# Patient Record
Sex: FEMALE | Race: WHITE | Employment: FULL TIME | URBAN - METROPOLITAN AREA
[De-identification: names, ages, dates, MRNs, and addresses within clinical notes are randomized per-mention and may not be internally consistent; named-entity substitution may affect disease eponyms.]

---

## 2018-08-06 ENCOUNTER — EMPLOYEE HEALTH (OUTPATIENT)
Dept: OTHER | Facility: HOSPITAL | Age: 53
End: 2018-08-06
Attending: PREVENTIVE MEDICINE

## 2018-08-06 DIAGNOSIS — Z01.84 IMMUNITY STATUS TESTING: Primary | ICD-10-CM

## 2018-08-06 LAB
RUBV IGG SER QL: POSITIVE
RUBV IGG SER-ACNC: 164.2 IU/ML (ref 10–?)

## 2018-08-06 PROCEDURE — 86787 VARICELLA-ZOSTER ANTIBODY: CPT

## 2018-08-06 PROCEDURE — 86765 RUBEOLA ANTIBODY: CPT

## 2018-08-06 PROCEDURE — 86762 RUBELLA ANTIBODY: CPT

## 2018-08-06 PROCEDURE — 86735 MUMPS ANTIBODY: CPT

## 2018-08-07 LAB
MEV IGG SER IA-ACNC: POSITIVE
MUV IGG SER IA-ACNC: POSITIVE
VZV IGG SER IA-ACNC: POSITIVE

## 2020-01-10 PROBLEM — R14.1 GAS PAIN: Status: ACTIVE | Noted: 2020-01-10

## 2020-01-10 PROBLEM — Z12.11 SPECIAL SCREENING FOR MALIGNANT NEOPLASM OF COLON: Status: ACTIVE | Noted: 2020-01-10

## 2020-01-14 ENCOUNTER — APPOINTMENT (OUTPATIENT)
Dept: LAB | Age: 55
End: 2020-01-14
Attending: INTERNAL MEDICINE
Payer: COMMERCIAL

## 2020-01-14 DIAGNOSIS — R17 ELEVATED BILIRUBIN: ICD-10-CM

## 2020-01-14 LAB
ALBUMIN SERPL-MCNC: 4.3 G/DL (ref 3.4–5)
ALBUMIN/GLOB SERPL: 1.2 {RATIO} (ref 1–2)
ALP LIVER SERPL-CCNC: 80 U/L (ref 41–108)
ALT SERPL-CCNC: 30 U/L (ref 13–56)
ANION GAP SERPL CALC-SCNC: 3 MMOL/L (ref 0–18)
AST SERPL-CCNC: 21 U/L (ref 15–37)
BILIRUB DIRECT SERPL-MCNC: 0.3 MG/DL (ref 0–0.2)
BILIRUB SERPL-MCNC: 1.7 MG/DL (ref 0.1–2)
BUN BLD-MCNC: 19 MG/DL (ref 7–18)
BUN/CREAT SERPL: 22.4 (ref 10–20)
CALCIUM BLD-MCNC: 9.8 MG/DL (ref 8.5–10.1)
CHLORIDE SERPL-SCNC: 109 MMOL/L (ref 98–112)
CO2 SERPL-SCNC: 29 MMOL/L (ref 21–32)
CREAT BLD-MCNC: 0.85 MG/DL (ref 0.55–1.02)
GLOBULIN PLAS-MCNC: 3.5 G/DL (ref 2.8–4.4)
GLUCOSE BLD-MCNC: 86 MG/DL (ref 70–99)
M PROTEIN MFR SERPL ELPH: 7.8 G/DL (ref 6.4–8.2)
OSMOLALITY SERPL CALC.SUM OF ELEC: 294 MOSM/KG (ref 275–295)
PATIENT FASTING Y/N/NP: NO
POTASSIUM SERPL-SCNC: 3.8 MMOL/L (ref 3.5–5.1)
SODIUM SERPL-SCNC: 141 MMOL/L (ref 136–145)

## 2020-01-14 PROCEDURE — 80053 COMPREHEN METABOLIC PANEL: CPT

## 2020-01-14 PROCEDURE — 82248 BILIRUBIN DIRECT: CPT

## 2020-01-14 PROCEDURE — 36415 COLL VENOUS BLD VENIPUNCTURE: CPT

## 2020-02-11 ENCOUNTER — HOSPITAL ENCOUNTER (OUTPATIENT)
Dept: ULTRASOUND IMAGING | Age: 55
Discharge: HOME | End: 2020-02-11
Attending: INTERNAL MEDICINE
Payer: COMMERCIAL

## 2020-02-11 DIAGNOSIS — R74.01 ELEVATED ALT MEASUREMENT: ICD-10-CM

## 2020-02-11 PROCEDURE — 76700 US EXAM ABDOM COMPLETE: CPT | Performed by: INTERNAL MEDICINE

## 2020-06-25 DIAGNOSIS — Z78.9 PARTICIPANT IN HEALTH AND WELLNESS PLAN: Primary | ICD-10-CM

## 2020-07-08 ENCOUNTER — NURSE ONLY (OUTPATIENT)
Dept: LAB | Age: 55
End: 2020-07-08
Attending: PREVENTIVE MEDICINE

## 2020-07-08 DIAGNOSIS — Z78.9 PARTICIPANT IN HEALTH AND WELLNESS PLAN: ICD-10-CM

## 2020-07-08 LAB — SARS-COV-2 IGG SERPLBLD QL IA.RAPID: NEGATIVE

## 2020-07-08 PROCEDURE — 86769 SARS-COV-2 COVID-19 ANTIBODY: CPT

## 2020-07-22 ENCOUNTER — TELEPHONE (OUTPATIENT)
Dept: INTERNAL MEDICINE CLINIC | Facility: HOSPITAL | Age: 55
End: 2020-07-22

## 2020-07-22 ENCOUNTER — LAB ENCOUNTER (OUTPATIENT)
Dept: LAB | Facility: HOSPITAL | Age: 55
End: 2020-07-22
Attending: PREVENTIVE MEDICINE
Payer: COMMERCIAL

## 2020-07-22 DIAGNOSIS — Z20.822 SUSPECTED COVID-19 VIRUS INFECTION: ICD-10-CM

## 2020-07-22 DIAGNOSIS — Z20.822 SUSPECTED COVID-19 VIRUS INFECTION: Primary | ICD-10-CM

## 2020-07-22 LAB — SARS-COV-2 RNA RESP QL NAA+PROBE: NOT DETECTED

## 2020-08-11 ENCOUNTER — LAB REQUISITION (OUTPATIENT)
Dept: LAB | Facility: HOSPITAL | Age: 55
End: 2020-08-11
Payer: COMMERCIAL

## 2020-08-11 DIAGNOSIS — D49.2 NEOPLASM OF UNSPECIFIED BEHAVIOR OF BONE, SOFT TISSUE, AND SKIN: ICD-10-CM

## 2020-08-11 PROCEDURE — 88305 TISSUE EXAM BY PATHOLOGIST: CPT | Performed by: PLASTIC SURGERY

## 2020-11-24 ENCOUNTER — LAB ENCOUNTER (OUTPATIENT)
Dept: LAB | Age: 55
End: 2020-11-24
Attending: EMERGENCY MEDICINE
Payer: COMMERCIAL

## 2020-11-24 ENCOUNTER — TELEPHONE (OUTPATIENT)
Dept: INTERNAL MEDICINE CLINIC | Facility: HOSPITAL | Age: 55
End: 2020-11-24

## 2020-11-24 DIAGNOSIS — Z20.822 EXPOSURE TO COVID-19 VIRUS: Primary | ICD-10-CM

## 2020-11-24 NOTE — TELEPHONE ENCOUNTER
Results and RTW guidelines:    COVID RESULT GIVEN:    []POSITIVE     [x] NEGATIVE       Ordered  retest?  []Yes   [x] No (skip to RTW)       Date ordered:     11/24/20        Dated to be taken:  11/24/20    If Yes, PLACE ORDER NOW and instruct the

## 2020-12-19 ENCOUNTER — IMMUNIZATION (OUTPATIENT)
Dept: LAB | Facility: HOSPITAL | Age: 55
End: 2020-12-19
Attending: PREVENTIVE MEDICINE
Payer: COMMERCIAL

## 2020-12-19 DIAGNOSIS — Z23 NEED FOR VACCINATION: ICD-10-CM

## 2020-12-19 PROCEDURE — 0001A PFIZER-BIONTECH COVID-19 VACCINE: CPT

## 2021-01-09 ENCOUNTER — IMMUNIZATION (OUTPATIENT)
Dept: LAB | Facility: HOSPITAL | Age: 56
End: 2021-01-09
Attending: PREVENTIVE MEDICINE

## 2021-01-09 DIAGNOSIS — Z23 NEED FOR VACCINATION: ICD-10-CM

## 2021-01-09 PROCEDURE — 0002A SARSCOV2 VAC 30MCG/0.3ML IM: CPT

## 2021-07-28 ENCOUNTER — OFFICE VISIT (OUTPATIENT)
Dept: FAMILY MEDICINE CLINIC | Facility: CLINIC | Age: 56
End: 2021-07-28
Payer: COMMERCIAL

## 2021-07-28 VITALS
DIASTOLIC BLOOD PRESSURE: 64 MMHG | TEMPERATURE: 98 F | OXYGEN SATURATION: 98 % | WEIGHT: 150 LBS | RESPIRATION RATE: 16 BRPM | HEART RATE: 86 BPM | SYSTOLIC BLOOD PRESSURE: 104 MMHG | HEIGHT: 69 IN | BODY MASS INDEX: 22.22 KG/M2

## 2021-07-28 DIAGNOSIS — N30.01 ACUTE CYSTITIS WITH HEMATURIA: Primary | ICD-10-CM

## 2021-07-28 DIAGNOSIS — R39.9 UTI SYMPTOMS: ICD-10-CM

## 2021-07-28 LAB
BILIRUBIN: NEGATIVE
GLUCOSE (URINE DIPSTICK): NEGATIVE MG/DL
KETONES (URINE DIPSTICK): NEGATIVE MG/DL
MULTISTIX LOT#: 4011 NUMERIC
NITRITE, URINE: NEGATIVE
PH, URINE: 5.5 (ref 4.5–8)
PROTEIN (URINE DIPSTICK): NEGATIVE MG/DL
SPECIFIC GRAVITY: 1.02 (ref 1–1.03)
URINE-COLOR: YELLOW
UROBILINOGEN,SEMI-QN: 0.2 MG/DL (ref 0–1.9)

## 2021-07-28 PROCEDURE — 99202 OFFICE O/P NEW SF 15 MIN: CPT | Performed by: NURSE PRACTITIONER

## 2021-07-28 PROCEDURE — 3078F DIAST BP <80 MM HG: CPT | Performed by: NURSE PRACTITIONER

## 2021-07-28 PROCEDURE — 3074F SYST BP LT 130 MM HG: CPT | Performed by: NURSE PRACTITIONER

## 2021-07-28 PROCEDURE — 81003 URINALYSIS AUTO W/O SCOPE: CPT | Performed by: NURSE PRACTITIONER

## 2021-07-28 PROCEDURE — 87086 URINE CULTURE/COLONY COUNT: CPT | Performed by: NURSE PRACTITIONER

## 2021-07-28 PROCEDURE — 3008F BODY MASS INDEX DOCD: CPT | Performed by: NURSE PRACTITIONER

## 2021-07-28 RX ORDER — NITROFURANTOIN 25; 75 MG/1; MG/1
100 CAPSULE ORAL 2 TIMES DAILY
Qty: 10 CAPSULE | Refills: 0 | Status: SHIPPED | OUTPATIENT
Start: 2021-07-28 | End: 2021-08-02

## 2021-07-28 NOTE — PROGRESS NOTES
Migdalia Wang is a 49year old female. HPI:   Patient presents with symptoms of UTI for 2 days. Complaining of urinary frequency, urgency, dysuria, pos suprapubic presure. Denies back pain, fever, hematuria.   Pt has history of UTI in past.    PE Negative    Ketones, UA Negative Negative - Trace mg/dL    Spec Gravity 1.025 1.005 - 1.030    Blood Urine Large (A) Negative    PH Urine 5.5 5.0 - 8.0    Protein Urine Negative Negative - Trace mg/dL    Urobilinogen Urine 0.2 0.2 - 1.0 mg/dL    Nitrite Ur stay in the hospital. You may have a fever and lower back pain. Medicines to treat a UTI  Most UTIs are treated with antibiotics. These kill the bacteria. The length of time you need to take them depends on the type of infection.  It may be as short as 3 for professional medical care. Always follow your healthcare professional's instructions. The patient indicates understanding of these issues and agrees to the plan.

## 2021-07-28 NOTE — PATIENT INSTRUCTIONS
Urinary Tract Infections in Women  Urinary tract infections (UTIs) are most often caused by bacteria. These bacteria enter the urinary tract. The bacteria may come from inside the body.  Or they may travel from the skin outside the rectum or vagina into t They may also help prevent future UTIs. · Drink plenty of fluids. This includes water, juice, or other caffeine-free drinks. Fluids help flush bacteria out of your body. · Empty your bladder. Always empty your bladder when you feel the urge to pee.  And

## 2021-08-21 ENCOUNTER — OFFICE VISIT (OUTPATIENT)
Dept: FAMILY MEDICINE CLINIC | Facility: CLINIC | Age: 56
End: 2021-08-21
Payer: COMMERCIAL

## 2021-08-21 VITALS
BODY MASS INDEX: 22.22 KG/M2 | DIASTOLIC BLOOD PRESSURE: 72 MMHG | OXYGEN SATURATION: 99 % | HEIGHT: 69 IN | HEART RATE: 59 BPM | TEMPERATURE: 98 F | SYSTOLIC BLOOD PRESSURE: 122 MMHG | RESPIRATION RATE: 12 BRPM | WEIGHT: 150 LBS

## 2021-08-21 DIAGNOSIS — N30.01 ACUTE CYSTITIS WITH HEMATURIA: Primary | ICD-10-CM

## 2021-08-21 LAB
BILIRUBIN: NEGATIVE
GLUCOSE (URINE DIPSTICK): NEGATIVE MG/DL
KETONES (URINE DIPSTICK): NEGATIVE MG/DL
MULTISTIX LOT#: ABNORMAL NUMERIC
NITRITE, URINE: NEGATIVE
PH, URINE: 5.5 (ref 4.5–8)
PROTEIN (URINE DIPSTICK): NEGATIVE MG/DL
SPECIFIC GRAVITY: 1.02 (ref 1–1.03)
URINE-COLOR: YELLOW
UROBILINOGEN,SEMI-QN: 0.2 MG/DL (ref 0–1.9)

## 2021-08-21 PROCEDURE — 81003 URINALYSIS AUTO W/O SCOPE: CPT | Performed by: FAMILY MEDICINE

## 2021-08-21 PROCEDURE — 87086 URINE CULTURE/COLONY COUNT: CPT | Performed by: FAMILY MEDICINE

## 2021-08-21 PROCEDURE — 99213 OFFICE O/P EST LOW 20 MIN: CPT | Performed by: FAMILY MEDICINE

## 2021-08-21 PROCEDURE — 3008F BODY MASS INDEX DOCD: CPT | Performed by: FAMILY MEDICINE

## 2021-08-21 PROCEDURE — 3074F SYST BP LT 130 MM HG: CPT | Performed by: FAMILY MEDICINE

## 2021-08-21 PROCEDURE — 3078F DIAST BP <80 MM HG: CPT | Performed by: FAMILY MEDICINE

## 2021-08-21 RX ORDER — NITROFURANTOIN 25; 75 MG/1; MG/1
100 CAPSULE ORAL 2 TIMES DAILY
Qty: 10 CAPSULE | Refills: 0 | Status: SHIPPED | OUTPATIENT
Start: 2021-08-21 | End: 2021-09-04

## 2021-08-21 NOTE — PATIENT INSTRUCTIONS
Sangeeta Rinaldi,  If your urine culture comes back with no growth of bacteria, please follow up with your primary care provider regarding these symptoms. Bladder Infection, Female (Adult)     Urine normally doesn't have any germs (bacteria) in it.  But bacteria they travel up to the bladder, causing inflammation and infection. This often happens because of:  · Wiping incorrectly after urinating. Always wipe from front to back.   · Bowel incontinence  · Pregnancy  · Procedures such as having a catheter put in  · Ol wear tight-fitting pants. · Improve your diet and prevent constipation. Eat more fresh fruits and vegetables, and fiber. Eat less junk foods and fatty foods. · Don't have sex until your symptoms are gone. · Don't have caffeine, alcohol, and spicy foods.

## 2021-08-21 NOTE — PROGRESS NOTES
Eulalia Preciado is a 49year old female. S:  Patient presents today with the following concerns:  · Dysuria since this morning. Has not noticed blood in urine. Pressure in low abdomen. No back pain. No fevers/chills. · No vaginal symptoms. Placed This Encounter      Urine Dip, auto without Micro      Urine Culture, Routine    Meds & Refills for this Visit:  Requested Prescriptions     Signed Prescriptions Disp Refills   • nitrofurantoin monohydrate macro 100 MG Oral Cap 10 capsule 0     Sig:

## 2021-09-04 ENCOUNTER — OFFICE VISIT (OUTPATIENT)
Dept: FAMILY MEDICINE CLINIC | Facility: CLINIC | Age: 56
End: 2021-09-04
Payer: COMMERCIAL

## 2021-09-04 VITALS
WEIGHT: 150 LBS | HEART RATE: 70 BPM | OXYGEN SATURATION: 98 % | SYSTOLIC BLOOD PRESSURE: 122 MMHG | HEIGHT: 69 IN | BODY MASS INDEX: 22.22 KG/M2 | TEMPERATURE: 98 F | RESPIRATION RATE: 16 BRPM | DIASTOLIC BLOOD PRESSURE: 76 MMHG

## 2021-09-04 DIAGNOSIS — J02.9 SORE THROAT: ICD-10-CM

## 2021-09-04 DIAGNOSIS — Z20.822 ENCOUNTER FOR LABORATORY TESTING FOR COVID-19 VIRUS: Primary | ICD-10-CM

## 2021-09-04 LAB
CONTROL LINE PRESENT WITH A CLEAR BACKGROUND (YES/NO): YES YES/NO
KIT EXPIRATION DATE: NORMAL DATE
KIT LOT #: NORMAL NUMERIC
STREP GRP A CUL-SCR: NEGATIVE

## 2021-09-04 PROCEDURE — 3074F SYST BP LT 130 MM HG: CPT | Performed by: NURSE PRACTITIONER

## 2021-09-04 PROCEDURE — 87081 CULTURE SCREEN ONLY: CPT | Performed by: NURSE PRACTITIONER

## 2021-09-04 PROCEDURE — 99213 OFFICE O/P EST LOW 20 MIN: CPT | Performed by: NURSE PRACTITIONER

## 2021-09-04 PROCEDURE — 87880 STREP A ASSAY W/OPTIC: CPT | Performed by: NURSE PRACTITIONER

## 2021-09-04 PROCEDURE — 3078F DIAST BP <80 MM HG: CPT | Performed by: NURSE PRACTITIONER

## 2021-09-04 PROCEDURE — 3008F BODY MASS INDEX DOCD: CPT | Performed by: NURSE PRACTITIONER

## 2021-09-04 NOTE — PROGRESS NOTES
HPI:   Calla Lesch is a 49year old female who presents with ill symptoms for  2  days. Patient reports sore throat, congestion, OTC cold meds have not been helping, denies fever, denies cough.  Has tried Dayquil and Nyquil and Advil with not much r auscultation  CARDIO: RRR without murmur  Results for orders placed or performed in visit on 09/04/21   STREP A ASSAY W/OPTIC    Collection Time: 09/04/21 10:25 AM   Result Value Ref Range    Strep Grp A Screen negative Negative    Control Line Present wit

## 2021-09-04 NOTE — PATIENT INSTRUCTIONS
· Please start Claritin or Allegra daily for congestion/post nasal drip. Stop Nyquil products as this will worsen dryness. · Use salt water gargles and lozenges as needed for comfort. · Follow up with Dr. José Miguel Rm if symptoms persist longer than 2 weeks. directed by the healthcare provider. After this time, you or your child will not be contagious.  You or your child can then return to work or school when feeling better or as directed by this facility or your healthcare provider.   · Use the antibiotic medi or fever, unless another medicine was prescribed for this. If you have chronic liver or kidney disease or ever had a stomach ulcer or gastrointestinal bleeding, talk with your healthcare provider before using these medicines.    Follow-up care  Follow up wi first pass. The provider may want to confirm with a rectal temperature. · Ear (tympanic). Ear temperatures are accurate after 10months of age, but not before. · Armpit (axillary).  This is the least reliable but may be used for a first pass to check a chi follow your healthcare professional's instructions.

## 2021-09-05 LAB — SARS-COV-2 RNA RESP QL NAA+PROBE: DETECTED

## 2021-09-07 ENCOUNTER — TELEPHONE (OUTPATIENT)
Dept: INTERNAL MEDICINE CLINIC | Facility: HOSPITAL | Age: 56
End: 2021-09-07

## 2021-09-07 NOTE — TELEPHONE ENCOUNTER
Outside Covid Testing done    Results and RTW guidelines:    COVID RESULT reported:      Test type:    [] Rapid outside         [x] PCR outside    Date of test: 9/4/21    Test location: [facility]         [x] Result viewed in Epic with verbal consent r instructions  [x]  S/S of worsening infection/condition and importance of prompt medical re-evaluation including when to seek emergency care  [] If symptoms develop, stay home and call hotline for rapid test order    Estimated RTW clearance date:  9/13/21 when did they test positive?)      Previous history of covid: Yes []     No [x]   Any recent travel: Yes [x]     No []    If yes, location: 9/2/21 [place]    What shift do you work? days  When was the last shift you worked?  9/1/21  When are you next scheduled t

## 2021-09-29 ENCOUNTER — HOSPITAL ENCOUNTER (OUTPATIENT)
Age: 56
Discharge: HOME | End: 2021-09-29
Payer: COMMERCIAL

## 2021-09-29 VITALS
HEART RATE: 69 BPM | HEIGHT: 69 IN | BODY MASS INDEX: 22.22 KG/M2 | SYSTOLIC BLOOD PRESSURE: 134 MMHG | TEMPERATURE: 98 F | DIASTOLIC BLOOD PRESSURE: 57 MMHG | RESPIRATION RATE: 19 BRPM | OXYGEN SATURATION: 99 % | WEIGHT: 150 LBS

## 2021-09-29 DIAGNOSIS — K13.79 UVULAR EDEMA: Primary | ICD-10-CM

## 2021-09-29 PROCEDURE — 99213 OFFICE O/P EST LOW 20 MIN: CPT | Performed by: NURSE PRACTITIONER

## 2021-09-29 PROCEDURE — 87880 STREP A ASSAY W/OPTIC: CPT | Performed by: NURSE PRACTITIONER

## 2021-09-29 RX ORDER — PREDNISONE 20 MG/1
40 TABLET ORAL DAILY
Qty: 4 TABLET | Refills: 0 | Status: SHIPPED | OUTPATIENT
Start: 2021-09-29 | End: 2021-10-01

## 2021-09-29 NOTE — ED PROVIDER NOTES
Patient Seen in: [facility]mediate [address] [place] [address]      History   Patient presents with:  Sore Throat    Stated Complaint: Sore Throat;  Tested Positive on 9/4/21    Subjective:   HPI      CHIEF COMPLAINT:   Patient presents with:  Sore Throat      HPI:   E Medical History:   Diagnosis Date   • Allergic rhinitis    • Bloating    • Constipation    • Diarrhea, unspecified    • Frequent use of laxatives    • Hemorrhoids    • History of eating disorder Began in college into my 20s   • Irregular bowel habits    • lymphadenopathy. ED Course     Labs Reviewed   POCT RAPID STREP - Normal   GRP A STREP CULT, THROAT         ASSESSMENT AND PLAN:   Assessment: 1.  Pharyngitis: Rapid strep screen is negative     Plan: Discussed that due to symptoms and negative rapid str Physiological [facility]

## 2022-03-14 ENCOUNTER — TELEPHONE (OUTPATIENT)
Dept: INTERNAL MEDICINE CLINIC | Facility: HOSPITAL | Age: 57
End: 2022-03-14

## 2022-03-14 ENCOUNTER — NURSE ONLY (OUTPATIENT)
Dept: LAB | Facility: HOSPITAL | Age: 57
End: 2022-03-14
Attending: PREVENTIVE MEDICINE

## 2022-03-14 DIAGNOSIS — Z20.822 SUSPECTED 2019 NOVEL CORONAVIRUS INFECTION: ICD-10-CM

## 2022-03-14 LAB — SARS-COV-2 RNA RESP QL NAA+PROBE: NOT DETECTED

## 2022-03-14 NOTE — TELEPHONE ENCOUNTER
[x] [address] [facility]  []TORI   [] [address] [facility]  Manager : Chelsey montano    HAVE YOU RECEIVED THE COVID-19 Vaccine? Yes [x]    No []          If yes, date(s) received:            Pfizer 3-dose series    Dose 1  12/19/2020 (Covid-19 Vaccine Pfizer 30 mcg/0.3 ml)      Dose 2  01/09/2021 (Covid-19 Vaccine Pfizer 30 mcg/0.3 ml)       Which vaccine:  Pfizer [x]     Leila Ovalle []    J&J []      SYMPTOMS (reported via dashboard):  [] asymptomatic  [x] symptomatic cough, congestion, sore throat, headache  [] GI symptoms only    Symptom onset date:03/13/22  Any fever, vomiting or diarrhea?:Yes []     No []    If yes describe:    Employee has positive COVID Exposure? Yes []     No [x]    Date of exposure:     []  Coworker                       [] patient                        [] Family/friend    When was the last shift you worked?: [id number] on 03/10/22    PLAN:     COVID-19 testing ordered: [x] Rapid    [] [facility]              Date test is to be taken:   03/14/22    []  Outside testing                           Notes: son sick at home tested negative.     INSTRUCTIONS PROVIDED:    [x]  Employee was instructed to call Central scheduling at [phone] or use [employer] to make an appointment for their testing and once at the site remain in their vehicle and call [phone] to register for the appointment  []  May return to work if employee views negative result in [place] and remains fever, vomiting, and diarrhea free  [x]  May continue to work if remains asymptomatic and views negative result in [place]  []  Follow up for condition update when resulting  []  If symptoms develop, stay home and call hotline for rapid test order  [x]  If COVID positive results, off work minimum of 5 days from positive test or onset of symptoms (day 0)    []      On day 5, if asymptomatic or mildly symptomatic (with improving symptoms) may return to work day 6  []      On day 5, if symptomatic, call Employee Health for RTW screening        [x]  Plan noted above  []  Length of time to obtain results  []  Quarantine instructions  []  S/S of worsening infection/condition and importance of prompt medical re-evaluation including when to seek emergency care.    [x] The employee voiced understanding

## 2022-05-24 ENCOUNTER — HOSPITAL ENCOUNTER (OUTPATIENT)
Age: 57
Discharge: HOME | End: 2022-05-24
Payer: COMMERCIAL

## 2022-05-24 VITALS
SYSTOLIC BLOOD PRESSURE: 126 MMHG | TEMPERATURE: 98 F | HEIGHT: 69 IN | DIASTOLIC BLOOD PRESSURE: 63 MMHG | HEART RATE: 63 BPM | WEIGHT: 148 LBS | RESPIRATION RATE: 16 BRPM | OXYGEN SATURATION: 99 % | BODY MASS INDEX: 21.92 KG/M2

## 2022-05-24 DIAGNOSIS — J01.00 ACUTE MAXILLARY SINUSITIS, RECURRENCE NOT SPECIFIED: Primary | ICD-10-CM

## 2022-05-24 LAB — SARS-COV-2 RNA RESP QL NAA+PROBE: NOT DETECTED

## 2022-05-24 PROCEDURE — 99213 OFFICE O/P EST LOW 20 MIN: CPT | Performed by: NURSE PRACTITIONER

## 2022-05-24 PROCEDURE — U0002 COVID-19 LAB TEST NON-CDC: HCPCS | Performed by: NURSE PRACTITIONER

## 2022-05-24 RX ORDER — AMOXICILLIN AND CLAVULANATE POTASSIUM 875; 125 MG/1; MG/1
1 TABLET, FILM COATED ORAL 2 TIMES DAILY
Qty: 20 TABLET | Refills: 0 | Status: SHIPPED | OUTPATIENT
Start: 2022-05-24 | End: 2022-06-03

## 2022-05-24 RX ORDER — NEOMYCIN SULFATE, POLYMYXIN B SULFATE AND DEXAMETHASONE 3.5; 10000; 1 MG/ML; [USP'U]/ML; MG/ML
SUSPENSION/ DROPS OPHTHALMIC
COMMUNITY
Start: 2022-05-23

## 2022-05-24 NOTE — ED INITIAL ASSESSMENT (HPI)
Pt began 2 days ago with a red and swollen left eye.   Saw the eye doctor, and no abrasion, and given drops, and then began with sinus congestion dripped into her throat and facial pain no gum bleeding/no nose bleeding

## 2022-10-10 ENCOUNTER — IMMUNIZATION (OUTPATIENT)
Dept: LAB | Facility: HOSPITAL | Age: 57
End: 2022-10-10
Attending: PREVENTIVE MEDICINE
Payer: COMMERCIAL

## 2022-10-10 DIAGNOSIS — Z23 NEED FOR VACCINATION: Primary | ICD-10-CM

## 2022-10-10 PROCEDURE — 90471 IMMUNIZATION ADMIN: CPT

## 2023-01-04 ENCOUNTER — NURSE ONLY (OUTPATIENT)
Dept: LAB | Age: 58
End: 2023-01-04
Attending: OPHTHALMOLOGY
Payer: COMMERCIAL

## 2023-01-04 DIAGNOSIS — I10 ESSENTIAL HYPERTENSION, MALIGNANT: Primary | ICD-10-CM

## 2023-01-04 LAB
ATRIAL RATE: 67 BPM
P AXIS: 67 DEGREES
P-R INTERVAL: 158 MS
Q-T INTERVAL: 412 MS
QRS DURATION: 78 MS
QTC CALCULATION (BEZET): 435 MS
R AXIS: 87 DEGREES
T AXIS: 66 DEGREES
VENTRICULAR RATE: 67 BPM

## 2023-01-04 PROCEDURE — 93005 ELECTROCARDIOGRAM TRACING: CPT

## 2023-01-04 PROCEDURE — 93010 ELECTROCARDIOGRAM REPORT: CPT | Performed by: INTERNAL MEDICINE

## 2024-06-24 ENCOUNTER — HOSPITAL ENCOUNTER (OUTPATIENT)
Age: 59
Discharge: HOME | End: 2024-06-24

## 2024-06-24 VITALS
HEART RATE: 57 BPM | SYSTOLIC BLOOD PRESSURE: 132 MMHG | RESPIRATION RATE: 18 BRPM | TEMPERATURE: 98 F | DIASTOLIC BLOOD PRESSURE: 63 MMHG | OXYGEN SATURATION: 97 %

## 2024-06-24 DIAGNOSIS — L03.213 PERIORBITAL CELLULITIS OF RIGHT EYE: Primary | ICD-10-CM

## 2024-06-24 PROCEDURE — 99213 OFFICE O/P EST LOW 20 MIN: CPT | Performed by: NURSE PRACTITIONER

## 2024-06-24 RX ORDER — AMOXICILLIN AND CLAVULANATE POTASSIUM 875; 125 MG/1; MG/1
1 TABLET, FILM COATED ORAL 2 TIMES DAILY
Qty: 14 TABLET | Refills: 0 | Status: SHIPPED | OUTPATIENT
Start: 2024-06-24 | End: 2024-07-01

## 2024-06-24 NOTE — DISCHARGE INSTRUCTIONS
Take the antibiotic as prescribed.  Warm compresses and lid massages.  Call your doctor in 2 days to arrange a follow-up appointment.

## 2024-06-24 NOTE — ED INITIAL ASSESSMENT (HPI)
Pt c/o redness and swelling to right eye, noted initially on Thursday, denies vision changes, states there is some discomfort to the area

## 2024-06-24 NOTE — ED PROVIDER NOTES
Patient Seen in: [facility]      History     Chief Complaint   Patient presents with    Eye Problem     Swelling/redness in eye - Entered by patient     Stated Complaint: Eye Problem - Swelling/redness in eye x Thurs    Subjective:   59-year-old female who presents with redness and swelling underneath the left eye.  States she noticed it this Thursday.  She denies any skin trauma around the eye.  Denies eye trauma.  Denies vision changes.  Denies eye discharge.            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    Eyes:  Negative for photophobia, pain, discharge, redness, itching and visual disturbance.   Skin:         Redness to the skin below the right eye.   All other systems reviewed and are negative.      Positive for stated complaint: Eye Problem - Swelling/redness in eye x Thurs  Other systems are as noted in HPI.  Constitutional and vital signs reviewed.      All other systems reviewed and negative except as noted above.    Physical Exam     ED Triage Vitals [06/24/24 0912]   /63   Pulse 57   Resp 18   Temp 97.8 °F (36.6 °C)   Temp src Oral   SpO2 97 %   O2 Device None (Room air)       Current Vitals:   Vital Signs  BP: 132/63  Pulse: 57  Resp: 18  Temp: 97.8  °F (36.6 °C)  Temp src: Oral    Oxygen Therapy  SpO2: 97 %  O2 Device: None (Room air)            Physical Exam  Vitals and nursing note reviewed.   Constitutional:       General: She is not in acute distress.     Appearance: Normal appearance. She is not ill-appearing, toxic-appearing or diaphoretic.   Eyes:      General: Vision grossly intact.         Right eye: No discharge.      Extraocular Movements: Extraocular movements intact.      Conjunctiva/sclera: Conjunctivae normal.      Comments: No orbital tenderness.  Erythema and swelling to the skin below the right eye.   Pulmonary:      Effort: No respiratory distress.   Skin:     General: Skin is warm and dry.      Coloration: Skin is not jaundiced or pale.   Neurological:      Mental Status: She is alert.               ED Course   Labs Reviewed - No data to display                   MDM                                         Medical Decision Making  Healthy-appearing 59-year-old female with redness and swelling underneath the right eye.  No orbital tenderness.  No extraocular movement pain.  No eye discharge.  Not consistent with conjunctivitis.  Concerning for periorbital cellulitis.  Will empiric treat with Augmentin, especially with absence of skin trauma.    Supportive/home management of diagnosis/illness/injury discussed. Red flag symptoms discussed.  Signs and symptoms/criteria that would necessitate reevaluation, including ER evaluation discussed.  Patient and/or responsible adult verbalize and agree with management and plan of care.    Speech recognition software was used during this dictation.  There may be minor errors in transcription.      Risk  Prescription drug management.        Disposition and Plan     Clinical Impression:  1. Periorbital cellulitis of right eye         Disposition:  Discharge  6/24/2024  9:20 am    Follow-up:  Paula Elliott MD  [address]  [facility] [address]  [phone]    Call in 2 days            Medications  Prescribed:  Discharge Medication List as of 6/24/2024  9:22 AM        START taking these medications    Details   amoxicillin clavulanate 875-125 MG Oral Tab Take 1 tablet by mouth 2 (two) times daily for 7 days., Normal, Disp-14 tablet, R-0

## 2024-07-23 ENCOUNTER — HOSPITAL ENCOUNTER (OUTPATIENT)
Dept: GENERAL RADIOLOGY | Age: 59
Discharge: HOME | End: 2024-07-23
Attending: PHYSICIAN ASSISTANT
Payer: COMMERCIAL

## 2024-07-23 ENCOUNTER — TELEPHONE (OUTPATIENT)
Dept: ORTHOPEDICS CLINIC | Facility: CLINIC | Age: 59
End: 2024-07-23

## 2024-07-23 DIAGNOSIS — M25.562 LEFT KNEE PAIN, UNSPECIFIED CHRONICITY: ICD-10-CM

## 2024-07-23 DIAGNOSIS — Z01.89 ENCOUNTER FOR LOWER EXTREMITY COMPARISON IMAGING STUDY: ICD-10-CM

## 2024-07-23 DIAGNOSIS — M25.562 LEFT KNEE PAIN, UNSPECIFIED CHRONICITY: Primary | ICD-10-CM

## 2024-07-23 PROCEDURE — 73564 X-RAY EXAM KNEE 4 OR MORE: CPT | Performed by: PHYSICIAN ASSISTANT

## 2024-07-23 PROCEDURE — 73562 X-RAY EXAM OF KNEE 3: CPT | Performed by: PHYSICIAN ASSISTANT

## 2024-07-23 NOTE — TELEPHONE ENCOUNTER
New patient scheduled an appt with Nisha for Thursday 7/25 at [facility].    Left knee pain & swelling; no known injury.    No xrays on file; please enter orders.    Advised patient to arrive 30 min early for xrays or go to [facility] xray facility later today or tomorrow if she'd like.

## 2024-07-23 NOTE — TELEPHONE ENCOUNTER
XR ordered per ortho protocol. XR scheduled and [facility] let [facility] know that they should arrive 15-20 minutes early, in order for them to complete imaging.

## 2024-07-25 ENCOUNTER — OFFICE VISIT (OUTPATIENT)
Dept: ORTHOPEDICS CLINIC | Facility: CLINIC | Age: 59
End: 2024-07-25
Payer: COMMERCIAL

## 2024-07-25 VITALS — WEIGHT: 148 LBS | HEIGHT: 69 IN | BODY MASS INDEX: 21.92 KG/M2

## 2024-07-25 DIAGNOSIS — M25.462 EFFUSION, LEFT KNEE: ICD-10-CM

## 2024-07-25 DIAGNOSIS — M17.12 PRIMARY OSTEOARTHRITIS OF LEFT KNEE: Primary | ICD-10-CM

## 2024-07-25 PROCEDURE — 20610 DRAIN/INJ JOINT/BURSA W/O US: CPT | Performed by: PHYSICIAN ASSISTANT

## 2024-07-25 PROCEDURE — 99204 OFFICE O/P NEW MOD 45 MIN: CPT | Performed by: PHYSICIAN ASSISTANT

## 2024-07-25 RX ORDER — TRIAMCINOLONE ACETONIDE 40 MG/ML
40 INJECTION, SUSPENSION INTRA-ARTICULAR; INTRAMUSCULAR ONCE
Status: COMPLETED | OUTPATIENT
Start: 2024-07-25 | End: 2024-07-25

## 2024-07-25 RX ADMIN — TRIAMCINOLONE ACETONIDE 40 MG: 40 INJECTION, SUSPENSION INTRA-ARTICULAR; INTRAMUSCULAR at 11:47:00

## 2024-07-25 NOTE — PROGRESS NOTES
[facility] New Patient Note    CC: Left knee pain    HPI: This 59 year old female presents today with complaints of left knee pain.  Onset of symptoms started approximately 2 months ago.  She states that she stooped over and felt a pop in the left knee.  She did have some immediate discomfort and swelling that did resolve with ibuprofen and icing.  Since then, the knee has improved but then gotten swollen again multiple times.  She states over this past weekend, symptoms worsened and she may have overdone it.  Pain is localized to the anterolateral aspect of the knee.  She notes associated stiffness and tightness along with swelling.  She has continued she apply ice and take ibuprofen without resolution in symptoms.  She denies catching, locking or instability.  She does have discomfort going up and down stairs.  She is here for further evaluation.    Past Medical History:    Allergic rhinitis    Bloating    Constipation    Diarrhea, unspecified    Frequent use of laxatives    Hemorrhoids    History of eating disorder    Irregular bowel habits    Irritable bowel syndrome    Jaundice     Past Surgical History:   Procedure Laterality Date    Tonsillectomy  1991     Current Outpatient Medications   Medication Sig Dispense Refill    Neomycin-Polymyxin-Dexameth 3.5-22422-9.1 Ophthalmic Suspension       albuterol 108 (90 Base) MCG/ACT Inhalation Aero Soln Inhale 2 puffs into the lungs every 4 (four) hours as needed for Wheezing or Shortness of Breath. 1 each 0     No Known Allergies  History reviewed. No pertinent family history.  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      Physical Exam: She is a pleasant 59-year-old female in no acute distress.  She ambulates with a minimally antalgic gait.  Exam of the left knee and lower extremity reveals that the overlying skin is intact.  She does have a moderate effusion.  She has full extension and has flexion with discomfort to 115 degrees.  No medial or lateral joint line tenderness.  Negative Steinmann and Mercedes.  Minimal patellofemoral crepitus with range of motion.  Stable ligamentous exam.        Imaging: XR KNEE (3 VIEWS), RIGHT (CPT=73562)    Result Date: 7/23/2024  CONCLUSION:  See above.   LOCATION:  [facility]   Dictated by (CST): Lane Álvarez MD on 7/23/2024 at 1:59 PM     Finalized by (CST): Lane Álvarez MD on 7/23/2024 at 1:59 PM       XR KNEE, COMPLETE (4 OR MORE VIEWS), LEFT (CPT=73564)    Result Date: 7/23/2024  CONCLUSION:  See above.   LOCATION:  [facility]   Dictated by (CST): Lane Álvarez MD on 7/23/2024 at 1:33 PM     Finalized by (CST): Lane Álvarez MD on 7/23/2024 at 1:57 PM        PROCEDURE:  XR KNEE, COMPLETE (4 OR MORE VIEWS), LEFT (CPT=73564)      TECHNIQUE:  AP, lateral, sunrise, and tunnel views were obtained      COMPARISON:  None.      INDICATIONS:  M25.562 Left knee pain, unspecified chronicity      PATIENT STATED HISTORY: (As transcribed by Technologist)  Arthritic changes to left knee causing swelling and pain.          FINDINGS:    Joint space narrowing present at the knees bilaterally, greater over the medial compartment.  No joint effusion is seen.  No acute fracture or dislocation is seen.  If clinical symptoms persist then recommend follow-up imaging.           Assessment: Left knee early degenerative joint disease and effusion      Plan: I discussed x-ray and exam findings with the patient are consistent with early degenerative joint disease and effusion.  Fortunately there is no evidence of meniscal pathology or ligament injury.  Due to her persistent  symptoms, I recommend going ahead with an aspiration and cortisone injection to the left knee.  She will continue to ice and elevate the knee and modify her activities.  I discussed activity modification including cycling, swimming, elliptical to prevent further knee pain and injury.  If pain recurs or worsens including mechanical symptoms, further imaging with an MRI scan may be the appropriate next step.  She would like to proceed with the aspiration and cortisone injection and will follow-up with me as needed or sooner with worsening symptoms, questions or concerns.    Procedure: Risk, benefits, and alternatives to the aspiration and cortisone injection including but not limited to risk of needle infection, flareup, and failure to improve was discussed.  She would like to proceed under meticulous sterile technique, 4 cc Xylocaine was used to anesthetize the lateral patellofemoral joint of the left knee.  Then through an 18-gauge needle, 40cc of mike serosanguineous fluid was aspirated.  Through the same 18-gauge needle, 4 cc of Xylocaine and 40 mg of Kenalog was injected with free flow fluid.  A Band-Aid was applied.  An Ace wrap wrap was also applied.  Advised to follow-up with any adverse reactions.          Nisha Deal PA-C  Orthopedic Surgery   [address] [place] [id number]   t: [phone]  f: [phone]

## 2024-10-10 ENCOUNTER — LAB ENCOUNTER (OUTPATIENT)
Dept: LAB | Age: 59
End: 2024-10-10
Attending: INTERNAL MEDICINE
Payer: COMMERCIAL

## 2024-10-10 DIAGNOSIS — Z13.0 SCREENING FOR IRON DEFICIENCY ANEMIA: Primary | ICD-10-CM

## 2024-10-10 LAB
ALBUMIN SERPL-MCNC: 5.1 G/DL (ref 3.2–4.8)
ALBUMIN/GLOB SERPL: 2 {RATIO} (ref 1–2)
ALP LIVER SERPL-CCNC: 67 U/L
ALT SERPL-CCNC: 18 U/L
ANION GAP SERPL CALC-SCNC: 10 MMOL/L (ref 0–18)
AST SERPL-CCNC: 23 U/L (ref ?–34)
BASOPHILS # BLD AUTO: 0.03 X10(3) UL (ref 0–0.2)
BASOPHILS NFR BLD AUTO: 0.5 %
BILIRUB SERPL-MCNC: 1.9 MG/DL (ref 0.3–1.2)
BUN BLD-MCNC: 18 MG/DL (ref 9–23)
CALCIUM BLD-MCNC: 10.1 MG/DL (ref 8.7–10.4)
CHLORIDE SERPL-SCNC: 105 MMOL/L (ref 98–112)
CHOLEST SERPL-MCNC: 220 MG/DL (ref ?–200)
CO2 SERPL-SCNC: 26 MMOL/L (ref 21–32)
CREAT BLD-MCNC: 0.85 MG/DL
EGFRCR SERPLBLD CKD-EPI 2021: 80 ML/MIN/1.73M2 (ref 60–?)
EOSINOPHIL # BLD AUTO: 0.17 X10(3) UL (ref 0–0.7)
EOSINOPHIL NFR BLD AUTO: 2.7 %
ERYTHROCYTE [DISTWIDTH] IN BLOOD BY AUTOMATED COUNT: 12.4 %
FASTING PATIENT LIPID ANSWER: YES
FASTING STATUS PATIENT QL REPORTED: YES
GLOBULIN PLAS-MCNC: 2.6 G/DL (ref 2–3.5)
GLUCOSE BLD-MCNC: 102 MG/DL (ref 70–99)
HCT VFR BLD AUTO: 41.5 %
HDLC SERPL-MCNC: 76 MG/DL (ref 40–59)
HGB BLD-MCNC: 14.3 G/DL
IMM GRANULOCYTES # BLD AUTO: 0.01 X10(3) UL (ref 0–1)
IMM GRANULOCYTES NFR BLD: 0.2 %
LDLC SERPL CALC-MCNC: 131 MG/DL (ref ?–100)
LYMPHOCYTES # BLD AUTO: 2.83 X10(3) UL (ref 1–4)
LYMPHOCYTES NFR BLD AUTO: 45.7 %
MCH RBC QN AUTO: 29.3 PG (ref 26–34)
MCHC RBC AUTO-ENTMCNC: 34.5 G/DL (ref 31–37)
MCV RBC AUTO: 85 FL
MONOCYTES # BLD AUTO: 0.53 X10(3) UL (ref 0.1–1)
MONOCYTES NFR BLD AUTO: 8.6 %
NEUTROPHILS # BLD AUTO: 2.62 X10 (3) UL (ref 1.5–7.7)
NEUTROPHILS # BLD AUTO: 2.62 X10(3) UL (ref 1.5–7.7)
NEUTROPHILS NFR BLD AUTO: 42.3 %
NONHDLC SERPL-MCNC: 144 MG/DL (ref ?–130)
OSMOLALITY SERPL CALC.SUM OF ELEC: 294 MOSM/KG (ref 275–295)
PLATELET # BLD AUTO: 299 10(3)UL (ref 150–450)
POTASSIUM SERPL-SCNC: 4.2 MMOL/L (ref 3.5–5.1)
PROT SERPL-MCNC: 7.7 G/DL (ref 5.7–8.2)
RBC # BLD AUTO: 4.88 X10(6)UL
SODIUM SERPL-SCNC: 141 MMOL/L (ref 136–145)
TRIGL SERPL-MCNC: 75 MG/DL (ref 30–149)
TSI SER-ACNC: 1.81 MIU/ML (ref 0.55–4.78)
VLDLC SERPL CALC-MCNC: 13 MG/DL (ref 0–30)
WBC # BLD AUTO: 6.2 X10(3) UL (ref 4–11)

## 2024-10-10 PROCEDURE — 36415 COLL VENOUS BLD VENIPUNCTURE: CPT

## 2024-10-10 PROCEDURE — 84443 ASSAY THYROID STIM HORMONE: CPT

## 2024-10-10 PROCEDURE — 85025 COMPLETE CBC W/AUTO DIFF WBC: CPT

## 2024-10-10 PROCEDURE — 80061 LIPID PANEL: CPT

## 2024-10-10 PROCEDURE — 80053 COMPREHEN METABOLIC PANEL: CPT

## 2024-11-01 ENCOUNTER — HOSPITAL ENCOUNTER (OUTPATIENT)
Dept: MAMMOGRAPHY | Age: 59
Discharge: HOME | End: 2024-11-01
Attending: INTERNAL MEDICINE
Payer: COMMERCIAL

## 2024-11-01 DIAGNOSIS — Z12.31 ENCOUNTER FOR SCREENING MAMMOGRAM FOR MALIGNANT NEOPLASM OF BREAST: ICD-10-CM

## 2024-11-01 PROCEDURE — 77063 BREAST TOMOSYNTHESIS BI: CPT | Performed by: INTERNAL MEDICINE

## 2024-11-01 PROCEDURE — 77067 SCR MAMMO BI INCL CAD: CPT | Performed by: INTERNAL MEDICINE

## (undated) DIAGNOSIS — Z20.822 SUSPECTED 2019 NOVEL CORONAVIRUS INFECTION: Primary | ICD-10-CM